# Patient Record
Sex: MALE | Race: WHITE | NOT HISPANIC OR LATINO | Employment: PART TIME | URBAN - METROPOLITAN AREA
[De-identification: names, ages, dates, MRNs, and addresses within clinical notes are randomized per-mention and may not be internally consistent; named-entity substitution may affect disease eponyms.]

---

## 2017-06-24 ENCOUNTER — APPOINTMENT (OUTPATIENT)
Dept: RADIOLOGY | Facility: MEDICAL CENTER | Age: 62
End: 2017-06-24
Attending: EMERGENCY MEDICINE

## 2017-06-24 ENCOUNTER — HOSPITAL ENCOUNTER (EMERGENCY)
Facility: MEDICAL CENTER | Age: 62
End: 2017-06-24
Attending: EMERGENCY MEDICINE

## 2017-06-24 VITALS
BODY MASS INDEX: 28.82 KG/M2 | SYSTOLIC BLOOD PRESSURE: 120 MMHG | HEART RATE: 63 BPM | DIASTOLIC BLOOD PRESSURE: 80 MMHG | TEMPERATURE: 97.2 F | OXYGEN SATURATION: 95 % | HEIGHT: 72 IN | RESPIRATION RATE: 18 BRPM | WEIGHT: 212.74 LBS
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
## 2017-06-24 LAB
COHGB MFR BLD: 3.4 % (ref 0–4.9)
EKG IMPRESSION: NORMAL
GLUCOSE BLD-MCNC: 303 MG/DL (ref 65–99)

## 2017-06-24 PROCEDURE — 82375 ASSAY CARBOXYHB QUANT: CPT

## 2017-06-24 PROCEDURE — 71010 DX-CHEST-LIMITED (1 VIEW): CPT

## 2017-06-24 PROCEDURE — 82962 GLUCOSE BLOOD TEST: CPT

## 2017-06-24 PROCEDURE — 93005 ELECTROCARDIOGRAM TRACING: CPT

## 2017-06-24 PROCEDURE — 99283 EMERGENCY DEPT VISIT LOW MDM: CPT

## 2017-06-24 PROCEDURE — 36415 COLL VENOUS BLD VENIPUNCTURE: CPT

## 2017-06-24 ASSESSMENT — PAIN SCALES - GENERAL: PAINLEVEL_OUTOF10: 2

## 2017-06-24 ASSESSMENT — LIFESTYLE VARIABLES: DO YOU DRINK ALCOHOL: NO
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
## 2017-06-24 NOTE — ED AVS SNAPSHOT
Vidapp Access Code: [email or web address]  Expires: 7/24/2017  4:08 AM    Your email address is not on file at [facility].  Email Addresses are required for you to sign up for Vidapp, please contact [phone] to verify your personal information and to provide your email address prior to attempting to register for Vidapp.    Kuldip Perrin  [address] [place] [address]    [facility] [employer]  A secure, online tool to manage your health information     [employer]’s Vidapp® is a secure, online tool that connects you to your personalized health information from the privacy of your home -- day or night - making it very easy for you to manage your healthcare. Once the activation process is completed, you can even access your medical information using the Vidapp imelda, which is available for free in the Apple Imelda store or Google Play store.     To learn more about Vidapp, visit [email or web address]    There are two levels of access available (as shown below):   My Chart Features  [facility] Primary Care Doctor [facility]  Specialists [facility]  Urgent  Care [facility] Primary Care Doctor   Email your healthcare team securely and privately 24/7 X X X    Manage appointments: schedule your next appointment; view details of past/upcoming appointments X      Request prescription refills. X      View recent personal medical records, including lab and immunizations X X X X   View health record, including health history, allergies, medications X X X X   Read reports about your outpatient visits, procedures, consult and ER notes X X X X   See your discharge summary, which is a recap of your hospital and/or ER visit that includes your diagnosis, lab results, and care plan X X  X     How to register for Vidapp:  Once your e-mail address has been verified, follow the following steps to sign up for Vidapp.     1. Go to  [email or web address]  2. Click on the Sign Up Now box, which takes you to the New Member Sign Up page. You will  need to provide the following information:  a. Enter your Squirrly Access Code exactly as it appears at the top of this page. (You will not need to use this code after you’ve completed the sign-up process. If you do not sign up before the expiration date, you must request a new code.)   b. Enter your date of birth.   c. Enter your home email address.   d. Click Submit, and follow the next screen’s instructions.  3. Create a VigLinkt ID. This will be your Squirrly login ID and cannot be changed, so think of one that is secure and easy to remember.  4. Create a Squirrly password. You can change your password at any time.  5. Enter your Password Reset Question and Answer. This can be used at a later time if you forget your password.   6. Enter your e-mail address. This allows you to receive e-mail notifications when new information is available in Squirrly.  7. Click Sign Up. You can now view your health information.    For assistance activating your Squirrly account, call [phone]

## 2017-06-24 NOTE — ED AVS SNAPSHOT
6/24/2017    Kuldip Perrin  [address] [place] [address]    Dear Kuldip:    [facility] wants to ensure your discharge home is safe and you or your loved ones have had all of your questions answered regarding your care after you leave the hospital.    Below is a list of resources and contact information should you have any questions regarding your hospital stay, follow-up instructions, or active medical symptoms.    Questions or Concerns Regarding… Contact   Medical Questions Related to Your Discharge  (7 days a week, 8am-5pm) Contact a Nurse Care Coordinator   [phone]   Medical Questions Not Related to Your Discharge  (24 hours a day / 7 days a week)  Contact the Nurse Health Line   [phone]    Medications or Discharge Instructions Refer to your discharge packet   or contact your [facility] Primary Care Provider   [phone]   Follow-up Appointment(s) Schedule your appointment via [employer]   or contact Scheduling [phone]   Billing Review your statement via [employer]  or contact Billing [phone]   Medical Records Review your records via [employer]   or contact Medical Records [phone]     You may receive a telephone call within two days of discharge. This call is to make certain you understand your discharge instructions and have the opportunity to have any questions answered. You can also easily access your medical information, test results and upcoming appointments via the [employer] free online health management tool. You can learn more and sign up at [employer]. For assistance setting up your [employer] account, please call [phone].    Once again, we want to ensure your discharge home is safe and that you have a clear understanding of any next steps in your care. If you have any questions or concerns, please do not hesitate to contact us, we are here for you. Thank you for choosing [facility] for your healthcare needs.    Sincerely,    Your [facility] Healthcare Team

## 2017-06-24 NOTE — ED NOTES
Pt given discharge instructions, verbalizes understanding of follow up. Ambulatory out of ED, steady on feet .
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
## 2018-03-06 ENCOUNTER — APPOINTMENT (OUTPATIENT)
Dept: RADIOLOGY | Facility: MEDICAL CENTER | Age: 63
End: 2018-03-06
Attending: EMERGENCY MEDICINE
Payer: COMMERCIAL

## 2018-03-06 ENCOUNTER — HOSPITAL ENCOUNTER (EMERGENCY)
Facility: MEDICAL CENTER | Age: 63
End: 2018-03-06
Attending: EMERGENCY MEDICINE
Payer: COMMERCIAL

## 2018-03-06 VITALS
WEIGHT: 215.83 LBS | BODY MASS INDEX: 29.23 KG/M2 | HEIGHT: 72 IN | TEMPERATURE: 98.6 F | SYSTOLIC BLOOD PRESSURE: 126 MMHG | HEART RATE: 83 BPM | DIASTOLIC BLOOD PRESSURE: 78 MMHG | OXYGEN SATURATION: 99 % | RESPIRATION RATE: 20 BRPM

## 2018-03-06 DIAGNOSIS — J18.9 PNEUMONIA OF RIGHT LUNG DUE TO INFECTIOUS ORGANISM, UNSPECIFIED PART OF LUNG: ICD-10-CM

## 2018-03-06 DIAGNOSIS — R05.9 COUGH: ICD-10-CM

## 2018-03-06 LAB
FLUAV RNA SPEC QL NAA+PROBE: NEGATIVE
FLUBV RNA SPEC QL NAA+PROBE: NEGATIVE
GLUCOSE BLD-MCNC: 129 MG/DL (ref 65–99)

## 2018-03-06 PROCEDURE — 700102 HCHG RX REV CODE 250 W/ 637 OVERRIDE(OP): Performed by: EMERGENCY MEDICINE

## 2018-03-06 PROCEDURE — 82962 GLUCOSE BLOOD TEST: CPT

## 2018-03-06 PROCEDURE — A9270 NON-COVERED ITEM OR SERVICE: HCPCS | Performed by: EMERGENCY MEDICINE

## 2018-03-06 PROCEDURE — 71046 X-RAY EXAM CHEST 2 VIEWS: CPT

## 2018-03-06 PROCEDURE — 700111 HCHG RX REV CODE 636 W/ 250 OVERRIDE (IP): Performed by: EMERGENCY MEDICINE

## 2018-03-06 PROCEDURE — 87502 INFLUENZA DNA AMP PROBE: CPT

## 2018-03-06 PROCEDURE — 99284 EMERGENCY DEPT VISIT MOD MDM: CPT

## 2018-03-06 PROCEDURE — 96372 THER/PROPH/DIAG INJ SC/IM: CPT

## 2018-03-06 RX ORDER — ONDANSETRON 4 MG/1
4 TABLET, ORALLY DISINTEGRATING ORAL ONCE
Status: COMPLETED | OUTPATIENT
Start: 2018-03-06 | End: 2018-03-06

## 2018-03-06 RX ORDER — KETOROLAC TROMETHAMINE 30 MG/ML
30 INJECTION, SOLUTION INTRAMUSCULAR; INTRAVENOUS ONCE
Status: COMPLETED | OUTPATIENT
Start: 2018-03-06 | End: 2018-03-06

## 2018-03-06 RX ORDER — AZITHROMYCIN 250 MG/1
TABLET, FILM COATED ORAL
Qty: 6 TAB | Refills: 0 | Status: SHIPPED | OUTPATIENT
Start: 2018-03-06

## 2018-03-06 RX ORDER — ALBUTEROL SULFATE 90 UG/1
2 AEROSOL, METERED RESPIRATORY (INHALATION) ONCE
Status: COMPLETED | OUTPATIENT
Start: 2018-03-06 | End: 2018-03-06

## 2018-03-06 RX ORDER — AZITHROMYCIN 250 MG/1
500 TABLET, FILM COATED ORAL ONCE
Status: COMPLETED | OUTPATIENT
Start: 2018-03-06 | End: 2018-03-06

## 2018-03-06 RX ADMIN — ONDANSETRON 4 MG: 4 TABLET, ORALLY DISINTEGRATING ORAL at 05:38

## 2018-03-06 RX ADMIN — KETOROLAC TROMETHAMINE 30 MG: 30 INJECTION, SOLUTION INTRAMUSCULAR at 05:40

## 2018-03-06 RX ADMIN — AZITHROMYCIN 500 MG: 250 TABLET, FILM COATED ORAL at 06:21

## 2018-03-06 RX ADMIN — ALBUTEROL SULFATE 2 PUFF: 90 AEROSOL, METERED RESPIRATORY (INHALATION) at 05:45

## 2018-03-06 ASSESSMENT — ENCOUNTER SYMPTOMS
NAUSEA: 0
COUGH: 1
CHILLS: 1
MYALGIAS: 1
VOMITING: 0
ABDOMINAL PAIN: 0
FEVER: 1
DIZZINESS: 0
HEADACHES: 0
SHORTNESS OF BREATH: 0

## 2018-03-06 ASSESSMENT — PAIN SCALES - GENERAL
PAINLEVEL_OUTOF10: 0
PAINLEVEL_OUTOF10: 1
PAINLEVEL_OUTOF10: 0
PAINLEVEL_OUTOF10: 6

## 2018-03-06 ASSESSMENT — PAIN SCALES - WONG BAKER
WONGBAKER_NUMERICALRESPONSE: DOESN'T HURT AT ALL
WONGBAKER_NUMERICALRESPONSE: DOESN'T HURT AT ALL
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
## 2018-03-06 NOTE — ED PROVIDER NOTES
ED Provider Note    Means of arrival: self  History obtained from: patient  History limited by: none    CHIEF COMPLAINT  Chief Complaint   Patient presents with   • Flu Like Symptoms     Pt. states cough, sore throat, cold, body aches, and chills, for the past few days.       HPI  Kuldip Perrin is a 62 y.o. male who presents to the Emergency Department for flulike symptoms. Patient reports that for the past few days he has had myalgias, cough, subjective fevers, chills, and nasal congestion. He states that his cough is productive of phlegm. He describes myalgias as aching, generalized, and mild in severity. He denies chest pain, shortness of breath, nausea, vomiting, and abdominal pain.    REVIEW OF SYSTEMS  Review of Systems   Constitutional: Positive for chills and fever.   Respiratory: Positive for cough. Negative for shortness of breath.    Cardiovascular: Negative for chest pain.   Gastrointestinal: Negative for abdominal pain, nausea and vomiting.   Musculoskeletal: Positive for myalgias.   Neurological: Negative for dizziness and headaches.       PAST MEDICAL HISTORY   has a past medical history of Diabetes (2009); GERD (gastroesophageal reflux disease); Hyperlipidemia; and Hypertension.    SURGICAL HISTORY   has a past surgical history that includes appendectomy.    SOCIAL HISTORY  Social History   Substance Use Topics   • Smoking status: Never Smoker   • Smokeless tobacco: Never Used   • Alcohol use No      History   Drug Use No       FAMILY HISTORY  Family History   Problem Relation Age of Onset   • Diabetes Mother    • Cancer Mother      lung   • Heart Disease Mother      CAD   • Cancer Sister 59     colon   • Diabetes Maternal Grandmother    • Diabetes Maternal Grandfather    • Stroke Maternal Grandfather        CURRENT MEDICATIONS  Home Medications     Reviewed by Cris Corona R.N. (Registered Nurse) on 03/06/18 at 0354  Med List Status: <None>   Medication Last Dose Status    asa/apap/caffeine (EXCEDRIN) 250-250-65 MG Tab 11/29/2016 Active   glipiZIDE (GLUCOTROL) 10 MG Tab  Active   ibuprofen (MOTRIN) 200 MG Tab 11/29/2016 Active   lisinopril (PRINIVIL) 5 MG Tab  Active   metformin (GLUCOPHAGE) 500 MG Tab  Active                ALLERGIES  Allergies   Allergen Reactions   • Cephalexin Shortness of Breath and Swelling     Rxn - 10/2015   • Ciprofloxacin Unspecified     Ankle tendonitis   • Codeine Unspecified     irritable       PHYSICAL EXAM  VITAL SIGNS: /74   Pulse 87   Temp 36 °C (96.8 °F)   Resp 14   Ht 1.829 m (6')   Wt 97.9 kg (215 lb 13.3 oz)   SpO2 97%   BMI 29.27 kg/m²   Vitals reviewed by myself.  Physical Exam   Constitutional: He is oriented to person, place, and time and well-developed, well-nourished, and in no distress. No distress.   HENT:   Head: Normocephalic.   Eyes: EOM are normal.   Neck: Normal range of motion.   Cardiovascular: Normal rate, regular rhythm and normal heart sounds.    Pulmonary/Chest: Effort normal and breath sounds normal. No respiratory distress. He has no wheezes. He has no rales.   Abdominal: Soft. He exhibits no distension. There is no tenderness. There is no rebound.   Musculoskeletal: Normal range of motion.   Neurological: He is alert and oriented to person, place, and time.         DIAGNOSTIC STUDIES /  LABS  Labs Reviewed   ACCU-CHEK GLUCOSE - Abnormal; Notable for the following:        Result Value    Glucose - Accu-Ck 129 (*)     All other components within normal limits   INFLUENZA A/B BY PCR      All labs reviewed by me.      RADIOLOGY  DX-CHEST-2 VIEWS   Final Result         1.  Right infrahilar density suggests underlying infrahilar infiltrate        The radiologist's interpretation of all radiological studies have been reviewed by me.      COURSE & MEDICAL DECISION MAKING  Nursing notes, VS, PMSFHx reviewed in chart.    Patient is a 62-year-old male who comes in for cough and flulike symptoms. Differential diagnosis  includes upper respiratory infection, viral syndrome, influenza, pneumonia. Diagnostic workup includes chest x-ray and influenza swab.    Patient's initial vitals are within normal limits. He is treated with Toradol and albuterol treatment. After treatment patient feels much improved. He states that his breathing has eased dramatically. Influenza swab returns in its negative for the flu. Chest x-ray returns and is positive for right infrahilar density suggestive of likely pneumonia. I advised patient that we will treat him with antibiotics. As he is allergic to fluoroquinolones and cephalosporins I elect to treat him with azithromycin. Patient is given 1st dose of azithromycin here in the emergency department and provided with prescription for azithromycin. He is then given strict return precautions and discharged home in stable condition with vitals are normal limits.       FINAL IMPRESSION  1. Cough    2. Pneumonia of right lung due to infectious organism, unspecified part of lung

## 2018-03-06 NOTE — DISCHARGE INSTRUCTIONS
Community-Acquired Pneumonia, Adult  Introduction  Pneumonia is an infection of the lungs. One type of pneumonia can happen while a person is in a hospital. A different type can happen when a person is not in a hospital (community-acquired pneumonia). It is easy for this kind to spread from person to person. It can spread to you if you breathe near an infected person who coughs or sneezes. Some symptoms include:  · A dry cough.  · A wet (productive) cough.  · Fever.  · Sweating.  · Chest pain.  Follow these instructions at home:  · Take over-the-counter and prescription medicines only as told by your doctor.  ¨ Only take cough medicine if you are losing sleep.  ¨ If you were prescribed an antibiotic medicine, take it as told by your doctor. Do not stop taking the antibiotic even if you start to feel better.  · Sleep with your head and neck raised (elevated). You can do this by putting a few pillows under your head, or you can sleep in a recliner.  · Do not use tobacco products. These include cigarettes, chewing tobacco, and e-cigarettes. If you need help quitting, ask your doctor.  · Drink enough water to keep your pee (urine) clear or pale yellow.  A shot (vaccine) can help prevent pneumonia. Shots are often suggested for:  · People older than 65 years of age.  · People older than 19 years of age:  ¨ Who are having cancer treatment.  ¨ Who have long-term (chronic) lung disease.  ¨ Who have problems with their body's defense system (immune system).  You may also prevent pneumonia if you take these actions:  · Get the flu (influenza) shot every year.  · Go to the dentist as often as told.  · Wash your hands often. If soap and water are not available, use hand .  Contact a doctor if:  · You have a fever.  · You lose sleep because your cough medicine does not help.  Get help right away if:  · You are short of breath and it gets worse.  · You have more chest pain.  · Your sickness gets worse. This is very  serious if:  ¨ You are an older adult.  ¨ Your body's defense system is weak.  · You cough up blood.  This information is not intended to replace advice given to you by your health care provider. Make sure you discuss any questions you have with your health care provider.  Document Released: 06/05/2009 Document Revised: 05/25/2017 Document Reviewed: 04/13/2016  © 2017 Elsevier

## 2018-03-06 NOTE — ED NOTES
PT IS AAOX4, PT IS IN NAD, PT IS BEING D/C, PT WAS GIVEN D/C INSTRUCTIONS AND HE STATED UNDERSTANDING. PT IS ABLE TO AMB  [facility] ASSISTANCE. PT WILL FOLLOW UP WITH PRIMARY CARE Md. PT LEFT WITH FAMILY.

## 2018-03-06 NOTE — ED TRIAGE NOTES
Kuldip Perrin  62 y.o. male  Chief Complaint   Patient presents with   • Flu Like Symptoms     Pt. states cough, sore throat, cold, body aches, and chills, for the past few days.     /74   Pulse 87   Temp 36 °C (96.8 °F)   Resp 14   Ht 1.829 m (6')   Wt 97.9 kg (215 lb 13.3 oz)   SpO2 97%   BMI 29.27 kg/m²     Pt amb to triage with steady gait for above complaint.   Pt is alert and oriented, speaking in full sentences, follows commands and responds appropriately to questions. NAD. Resp are even and unlabored.  Pt placed in lobby. Pt educated on triage process. Pt encouraged to alert staff for any changes.

## 2018-03-22 ENCOUNTER — HOSPITAL ENCOUNTER (EMERGENCY)
Facility: MEDICAL CENTER | Age: 63
End: 2018-03-22
Attending: EMERGENCY MEDICINE
Payer: COMMERCIAL

## 2018-03-22 VITALS
TEMPERATURE: 98.5 F | HEART RATE: 74 BPM | WEIGHT: 210.1 LBS | SYSTOLIC BLOOD PRESSURE: 111 MMHG | OXYGEN SATURATION: 93 % | RESPIRATION RATE: 14 BRPM | DIASTOLIC BLOOD PRESSURE: 64 MMHG | BODY MASS INDEX: 28.46 KG/M2 | HEIGHT: 72 IN

## 2018-03-22 DIAGNOSIS — B35.1 TINEA UNGUIUM: ICD-10-CM

## 2018-03-22 DIAGNOSIS — E11.8 DIABETIC FEET (HCC): ICD-10-CM

## 2018-03-22 PROCEDURE — 99283 EMERGENCY DEPT VISIT LOW MDM: CPT

## 2018-03-22 ASSESSMENT — ENCOUNTER SYMPTOMS
FALLS: 0
BACK PAIN: 0

## 2018-03-22 ASSESSMENT — PAIN SCALES - GENERAL: PAINLEVEL_OUTOF10: 0

## 2018-03-22 NOTE — ED PROVIDER NOTES
"ED Provider Note    Scribed for Rosalino Malik M.D. by Crystal Gallagher. 3/22/2018, 9:36 AM.    Primary care provider: Pcp Pt States None  Means of arrival: walk in   History obtained from: Patient  History limited by: None      CHIEF COMPLAINT  Chief Complaint   Patient presents with   • Toe Pain     pt reports toe pain, suspected fungal infection. Hx DM was told to come in \"for any abnormalitities of the feet.\" NO PCP       HPI  Kuldip Perrin is a 62 y.o. male who presents to the Emergency Department for evaluation of bilateral great toe pain onset several weeks ago. Patient has noted associated white discoloration to the pads of his toes. He was informed that his white discoloration may be due to a fungal infection. Patient has a history of cellulitis to his lower extremities and is concerned for recurrence. Patient confirms history of Diabetes. Patient also complains of chronic lower back pain. He denies any trauma or falls.       REVIEW OF SYSTEMS  Review of Systems   Musculoskeletal: Negative for back pain and falls.        + bilateral great toe pain with discoloration to pads.    E.       PAST MEDICAL HISTORY   has a past medical history of Diabetes (2009); GERD (gastroesophageal reflux disease); Hyperlipidemia; and Hypertension.      SURGICAL HISTORY   has a past surgical history that includes appendectomy.      SOCIAL HISTORY  Social History   Substance Use Topics   • Smoking status: Never Smoker   • Smokeless tobacco: Never Used   • Alcohol use No      History   Drug Use No       FAMILY HISTORY  Family History   Problem Relation Age of Onset   • Diabetes Mother    • Cancer Mother      lung   • Heart Disease Mother      CAD   • Cancer Sister 59     colon   • Diabetes Maternal Grandmother    • Diabetes Maternal Grandfather    • Stroke Maternal Grandfather        CURRENT MEDICATIONS  Home Medications    **Home medications have not yet been reviewed for this encounter**         ALLERGIES  Allergies "   Allergen Reactions   • Cephalexin Shortness of Breath and Swelling     Rxn - 10/2015   • Ciprofloxacin Unspecified     Ankle tendonitis   • Codeine Unspecified     irritable         PHYSICAL EXAM  VITAL SIGNS: /64   Pulse 73   Temp 36.9 °C (98.5 °F)   Resp 16   Ht 1.829 m (6')   Wt 95.3 kg (210 lb 1.6 oz)   SpO2 93%   BMI 28.49 kg/m²   Constitutional: Well developed, Well nourished, No acute distress, Non-toxic appearance.   HENT: Normocephalic, Atraumatic, Bilateral external ears normal.  Eyes:  conjunctiva is normal.   Neck: Supple.   Skin: Warm, Dry. Bilateral great toes with calluses noted. No signs of a wound, erythema or other abnormality. Toes with tinea unguium but no signs of erythema or infection.   Musculoskeletal: Good range of motion in all major joints. No tenderness to palpation or major deformities noted. Intact distal pulses, no clubbing, no cyanosis, no edema,   Neurologic: Alert & oriented x 3, Normal motor function, Normal sensory function, No focal deficits noted.   Psychiatric: Affect normal, Judgment normal, Mood normal.         COURSE & MEDICAL DECISION MAKING  Nursing notes, VS, PMSFHx reviewed in chart.    9:36 AM - Patient seen and examined at bedside. Patient informed he does have a fungal infection to his toes in addition to calluses. He was advised to follow up with Podiatry and wear different shoes. Additionally, encouraged that he observe for any signs of bacterial infection given his history of Diabetes and Cellulitis. Patient will follow up with the [facility] clinic.           DISPOSITION:  Patient will be discharged home in stable condition.      FOLLOW UP:  [address] [address]  [phone]  Schedule an appointment as soon as possible for a visit          OUTPATIENT MEDICATIONS:  Discharge Medication List as of 3/22/2018 10:03 AM          FINAL IMPRESSION  1. Diabetic feet (CMS-[facility])    2. Tinea unguium           Crystal AHUMADA.  Aileen (Joieibkathy), am scribing for, and in the presence of, Rosalino Malik M.D..  Electronically signed by: Crystal Gallagher (Kim), 3/22/2018  IRosalino M.D. personally performed the services described in this documentation, as scribed by Crystal Gallagher in my presence, and it is both accurate and complete.    The note accurately reflects work and decisions made by me.  Rosalino Malik  3/22/2018  12:51 PM

## 2018-03-22 NOTE — DISCHARGE INSTRUCTIONS
Diabetes and Foot Care  Diabetes may cause you to have problems because of poor blood supply (circulation) to your feet and legs. This may cause the skin on your feet to become thinner, break easier, and heal more slowly. Your skin may become dry, and the skin may peel and crack. You may also have nerve damage in your legs and feet causing decreased feeling in them. You may not notice minor injuries to your feet that could lead to infections or more serious problems. Taking care of your feet is one of the most important things you can do for yourself.  Follow these instructions at home:  · Wear shoes at all times, even in the house. Do not go barefoot. Bare feet are easily injured.  · Check your feet daily for blisters, cuts, and redness. If you cannot see the bottom of your feet, use a mirror or ask someone for help.  · Wash your feet with warm water (do not use hot water) and mild soap. Then pat your feet and the areas between your toes until they are completely dry. Do not soak your feet as this can dry your skin.  · Apply a moisturizing lotion or petroleum jelly (that does not contain alcohol and is unscented) to the skin on your feet and to dry, brittle toenails. Do not apply lotion between your toes.  · Trim your toenails straight across. Do not dig under them or around the cuticle. File the edges of your nails with an emery board or nail file.  · Do not cut corns or calluses or try to remove them with medicine.  · Wear clean socks or stockings every day. Make sure they are not too tight. Do not wear knee-high stockings since they may decrease blood flow to your legs.  · Wear shoes that fit properly and have enough cushioning. To break in new shoes, wear them for just a few hours a day. This prevents you from injuring your feet. Always look in your shoes before you put them on to be sure there are no objects inside.  · Do not cross your legs. This may decrease the blood flow to your feet.  · If you find a  minor scrape, cut, or break in the skin on your feet, keep it and the skin around it clean and dry. These areas may be cleansed with mild soap and water. Do not cleanse the area with peroxide, alcohol, or iodine.  · When you remove an adhesive bandage, be sure not to damage the skin around it.  · If you have a wound, look at it several times a day to make sure it is healing.  · Do not use heating pads or hot water bottles. They may burn your skin. If you have lost feeling in your feet or legs, you may not know it is happening until it is too late.  · Make sure your health care provider performs a complete foot exam at least annually or more often if you have foot problems. Report any cuts, sores, or bruises to your health care provider immediately.  Contact a health care provider if:  · You have an injury that is not healing.  · You have cuts or breaks in the skin.  · You have an ingrown nail.  · You notice redness on your legs or feet.  · You feel burning or tingling in your legs or feet.  · You have pain or cramps in your legs and feet.  · Your legs or feet are numb.  · Your feet always feel cold.  Get help right away if:  · There is increasing redness, swelling, or pain in or around a wound.  · There is a red line that goes up your leg.  · Pus is coming from a wound.  · You develop a fever or as directed by your health care provider.  · You notice a bad smell coming from an ulcer or wound.  This information is not intended to replace advice given to you by your health care provider. Make sure you discuss any questions you have with your health care provider.  Document Released: 12/15/2001 Document Revised: 05/25/2017 Document Reviewed: 05/27/2014  [employer] Patient Education © 2017 [employer]      Fungal Nail Infection  Introduction  Fungal nail infection is a common fungal infection of the toenails or fingernails. This condition affects toenails more often than fingernails. More than one nail may  be infected. The condition can be passed from person to person (is contagious).  What are the causes?  This condition is caused by a fungus. Several types of funguses can cause the infection. These funguses are common in moist and warm areas. If your hands or feet come into contact with the fungus, it may get into a crack in your fingernail or toenail and cause the infection.  What increases the risk?  The following factors may make you more likely to develop this condition:  · Being male.  · Having diabetes.  · Being of older age.  · Living with someone who has the fungus.  · Walking barefoot in areas where the fungus thrives, such as showers or locker rooms.  · Having poor circulation.  · Wearing shoes and socks that cause your feet to sweat.  · Having athlete’s foot.  · Having a nail injury or history of a recent nail surgery.  · Having psoriasis.  · Having a weak body defense system (immune system).  What are the signs or symptoms?  Symptoms of this condition include:  · A pale spot on the nail.  · Thickening of the nail.  · A nail that becomes yellow or brown.  · A brittle or ragged nail edge.  · A crumbling nail.  · A nail that has lifted away from the nail bed.  How is this diagnosed?  This condition is diagnosed with a physical exam. Your health care provider may take a scraping or clipping from your nail to test for the fungus.  How is this treated?  Mild infections do not need treatment. If you have significant nail changes, treatment may include:  · Oral antifungal medicines. You may need to take the medicine for several weeks or several months, and you may not see the results for a long time. These medicines can cause side effects. Ask your health care provider what problems to watch for.  · Antifungal nail polish and nail cream. These may be used along with oral antifungal medicines.  · Laser treatment of the nail.  · Surgery to remove the nail. This may be needed for the most severe  infections.  Treatment takes a long time, and the infection may come back.  Follow these instructions at home:  Medicines  · Take or apply over-the-counter and prescription medicines only as told by your health care provider.  · Ask your health care provider about using over-the-counter mentholated ointment on your nails.  Lifestyle  · Do not share personal items, such as towels or nail clippers.  · Trim your nails often.  · Wash and dry your hands and feet every day.  · Wear absorbent socks, and change your socks frequently.  · Wear shoes that allow air to circulate, such as sandals or canvas tennis shoes. Throw out old shoes.  · Wear rubber gloves if you are working with your hands in wet areas.  · Do not walk barefoot in shower rooms or locker rooms.  · Do not use a nail salon that does not use clean instruments.  · Do not use artificial nails.  General instructions  · Keep all follow-up visits as told by your health care provider. This is important.  · Use antifungal foot powder on your feet and in your shoes.  Contact a health care provider if:  Your infection is not getting better or it is getting worse after several months.  This information is not intended to replace advice given to you by your health care provider. Make sure you discuss any questions you have with your health care provider.  Document Released: 12/15/2001 Document Revised: 05/25/2017 Document Reviewed: 06/20/2016  © 2017 [employer]

## 2018-03-22 NOTE — ED TRIAGE NOTES
"Chief Complaint   Patient presents with   • Toe Pain     pt reports toe pain, suspected fungal infection. Hx DM was told to come in \"for any abnormalitities of the feet.\" NO PCP     Blood pressure 146/64, pulse 73, temperature 36.9 °C (98.5 °F), resp. rate 16, height 1.829 m (6'), weight 95.3 kg (210 lb 1.6 oz), SpO2 93 %.    Pt informed of wait times. Educated on triage process.  Asked to return to triage RN for any new or worsening of symptoms. Thanked for patience.        "

## 2018-06-26 ENCOUNTER — HOSPITAL ENCOUNTER (EMERGENCY)
Facility: MEDICAL CENTER | Age: 63
End: 2018-06-26
Attending: EMERGENCY MEDICINE
Payer: COMMERCIAL

## 2018-06-26 ENCOUNTER — APPOINTMENT (OUTPATIENT)
Dept: RADIOLOGY | Facility: MEDICAL CENTER | Age: 63
End: 2018-06-26
Payer: COMMERCIAL

## 2018-06-26 VITALS
TEMPERATURE: 98.9 F | WEIGHT: 211.86 LBS | OXYGEN SATURATION: 98 % | SYSTOLIC BLOOD PRESSURE: 121 MMHG | DIASTOLIC BLOOD PRESSURE: 74 MMHG | HEIGHT: 72 IN | BODY MASS INDEX: 28.7 KG/M2 | HEART RATE: 77 BPM | RESPIRATION RATE: 16 BRPM

## 2018-06-26 DIAGNOSIS — M75.112 INCOMPLETE TEAR OF LEFT ROTATOR CUFF: ICD-10-CM

## 2018-06-26 LAB
ALBUMIN SERPL BCP-MCNC: 4.5 G/DL (ref 3.2–4.9)
ALBUMIN/GLOB SERPL: 1.4 G/DL
ALP SERPL-CCNC: 69 U/L (ref 30–99)
ALT SERPL-CCNC: 37 U/L (ref 2–50)
ANION GAP SERPL CALC-SCNC: 9 MMOL/L (ref 0–11.9)
APTT PPP: 36.8 SEC (ref 24.7–36)
AST SERPL-CCNC: 19 U/L (ref 12–45)
BASOPHILS # BLD AUTO: 0.2 % (ref 0–1.8)
BASOPHILS # BLD: 0.01 K/UL (ref 0–0.12)
BILIRUB SERPL-MCNC: 0.6 MG/DL (ref 0.1–1.5)
BNP SERPL-MCNC: 21 PG/ML (ref 0–100)
BUN SERPL-MCNC: 22 MG/DL (ref 8–22)
CALCIUM SERPL-MCNC: 9.3 MG/DL (ref 8.5–10.5)
CHLORIDE SERPL-SCNC: 105 MMOL/L (ref 96–112)
CO2 SERPL-SCNC: 21 MMOL/L (ref 20–33)
CREAT SERPL-MCNC: 0.78 MG/DL (ref 0.5–1.4)
EKG IMPRESSION: NORMAL
EOSINOPHIL # BLD AUTO: 0.07 K/UL (ref 0–0.51)
EOSINOPHIL NFR BLD: 1.1 % (ref 0–6.9)
ERYTHROCYTE [DISTWIDTH] IN BLOOD BY AUTOMATED COUNT: 38.7 FL (ref 35.9–50)
GLOBULIN SER CALC-MCNC: 3.2 G/DL (ref 1.9–3.5)
GLUCOSE SERPL-MCNC: 157 MG/DL (ref 65–99)
HCT VFR BLD AUTO: 40.8 % (ref 42–52)
HGB BLD-MCNC: 14.7 G/DL (ref 14–18)
IMM GRANULOCYTES # BLD AUTO: 0.02 K/UL (ref 0–0.11)
IMM GRANULOCYTES NFR BLD AUTO: 0.3 % (ref 0–0.9)
INR PPP: 0.99 (ref 0.87–1.13)
LIPASE SERPL-CCNC: 9 U/L (ref 11–82)
LYMPHOCYTES # BLD AUTO: 2.03 K/UL (ref 1–4.8)
LYMPHOCYTES NFR BLD: 31 % (ref 22–41)
MCH RBC QN AUTO: 30.5 PG (ref 27–33)
MCHC RBC AUTO-ENTMCNC: 36 G/DL (ref 33.7–35.3)
MCV RBC AUTO: 84.6 FL (ref 81.4–97.8)
MONOCYTES # BLD AUTO: 0.69 K/UL (ref 0–0.85)
MONOCYTES NFR BLD AUTO: 10.5 % (ref 0–13.4)
NEUTROPHILS # BLD AUTO: 3.73 K/UL (ref 1.82–7.42)
NEUTROPHILS NFR BLD: 56.9 % (ref 44–72)
NRBC # BLD AUTO: 0 K/UL
NRBC BLD-RTO: 0 /100 WBC
PLATELET # BLD AUTO: 243 K/UL (ref 164–446)
PMV BLD AUTO: 9.8 FL (ref 9–12.9)
POTASSIUM SERPL-SCNC: 4 MMOL/L (ref 3.6–5.5)
PROT SERPL-MCNC: 7.7 G/DL (ref 6–8.2)
PROTHROMBIN TIME: 12.8 SEC (ref 12–14.6)
RBC # BLD AUTO: 4.82 M/UL (ref 4.7–6.1)
SODIUM SERPL-SCNC: 135 MMOL/L (ref 135–145)
TROPONIN I SERPL-MCNC: <0.01 NG/ML (ref 0–0.04)
WBC # BLD AUTO: 6.6 K/UL (ref 4.8–10.8)

## 2018-06-26 PROCEDURE — 85610 PROTHROMBIN TIME: CPT

## 2018-06-26 PROCEDURE — 85025 COMPLETE CBC W/AUTO DIFF WBC: CPT

## 2018-06-26 PROCEDURE — 700111 HCHG RX REV CODE 636 W/ 250 OVERRIDE (IP): Performed by: EMERGENCY MEDICINE

## 2018-06-26 PROCEDURE — 96372 THER/PROPH/DIAG INJ SC/IM: CPT

## 2018-06-26 PROCEDURE — 84484 ASSAY OF TROPONIN QUANT: CPT

## 2018-06-26 PROCEDURE — 93005 ELECTROCARDIOGRAM TRACING: CPT | Performed by: EMERGENCY MEDICINE

## 2018-06-26 PROCEDURE — 85730 THROMBOPLASTIN TIME PARTIAL: CPT

## 2018-06-26 PROCEDURE — 80053 COMPREHEN METABOLIC PANEL: CPT

## 2018-06-26 PROCEDURE — 83690 ASSAY OF LIPASE: CPT

## 2018-06-26 PROCEDURE — 99284 EMERGENCY DEPT VISIT MOD MDM: CPT

## 2018-06-26 PROCEDURE — 83880 ASSAY OF NATRIURETIC PEPTIDE: CPT

## 2018-06-26 PROCEDURE — 93005 ELECTROCARDIOGRAM TRACING: CPT

## 2018-06-26 RX ORDER — KETOROLAC TROMETHAMINE 10 MG/1
10 TABLET, FILM COATED ORAL EVERY 4 HOURS PRN
Qty: 30 TAB | Refills: 0 | Status: SHIPPED | OUTPATIENT
Start: 2018-06-26

## 2018-06-26 RX ORDER — KETOROLAC TROMETHAMINE 30 MG/ML
15 INJECTION, SOLUTION INTRAMUSCULAR; INTRAVENOUS ONCE
Status: COMPLETED | OUTPATIENT
Start: 2018-06-26 | End: 2018-06-26

## 2018-06-26 RX ADMIN — KETOROLAC TROMETHAMINE 15 MG: 30 INJECTION, SOLUTION INTRAMUSCULAR at 23:01

## 2018-06-26 ASSESSMENT — ENCOUNTER SYMPTOMS
NECK PAIN: 1
SHORTNESS OF BREATH: 0

## 2018-06-26 ASSESSMENT — LIFESTYLE VARIABLES: DO YOU DRINK ALCOHOL: NO

## 2018-06-26 ASSESSMENT — PAIN SCALES - GENERAL: PAINLEVEL_OUTOF10: 7

## 2018-06-27 NOTE — ED PROVIDER NOTES
ED Provider Note    Scribed for Chauncey Hadley M.D. by Odette Mayers. 6/26/2018  10:15 PM    Means of arrival: walk in   History obtained by: patient   Limitations: none       CHIEF COMPLAINT  Chief Complaint   Patient presents with   • Neck Pain     started lastnight started at work with minor labor    • Shoulder Pain     Left side, started lastnight at same time   • Arm Pain     Left side, started lastnight       HPI  Kuldip Perrin is a 63 y.o. male who presents for evaluation of shoulder pain onset last night. Patient was working last night lifting some boxes when he began to notice the pain. He reports associated left shoulder pain and left arm pain. He has taken Ibuprofen for pain relief. No complaints of shortness of breath. Patient denies any associated nausea or diaphoresis. He denies any associated chest pain.    REVIEW OF SYSTEMS  Review of Systems   Respiratory: Negative for shortness of breath.    Musculoskeletal: Positive for joint pain (left shoulder) and neck pain.        Left arm pain    See HPI for further details. E    PAST MEDICAL HISTORY   has a past medical history of Diabetes (2009); GERD (gastroesophageal reflux disease); Hyperlipidemia; and Hypertension.    SOCIAL HISTORY  Social History     Social History Main Topics   • Smoking status: Never Smoker   • Smokeless tobacco: Never Used   • Alcohol use No   • Drug use: No   • Sexual activity: None noted       Comment: ,promotional advertising       SURGICAL HISTORY   has a past surgical history that includes appendectomy.    CURRENT MEDICATIONS  Current medications can be reviewed in the nurse's note.     ALLERGIES  Allergies   Allergen Reactions   • Cephalexin Shortness of Breath and Swelling     Rxn - 10/2015   • Ciprofloxacin Unspecified     Ankle tendonitis   • Codeine Unspecified     irritable       PHYSICAL EXAM  /74   Pulse 77   Temp 37.2 °C (98.9 °F)   Resp 16   Ht 1.829 m (6')   Wt 96.1 kg (211 lb 13.8 oz)    SpO2 98%   BMI 28.73 kg/m²   Constitutional: Well developed, Well nourished, No acute distress, Non-toxic appearance.   HENT: Normocephalic, Atraumatic,  Eyes: PERRL, EOM intact  Neck: Supple, no meningismus  Lymphatic: No lymphadenopathy noted.   Cardiovascular: Regular rate and rhythm  Lungs: Clear to auscultation bilaterally, easy unlabored respirations   Abdomen: Bowel sounds normal, Soft, No tenderness  Skin: Warm, Dry, no rash  Back: No tenderness, No CVA tenderness.   Extremities: Mild pain overlying the left rotator cuff, full range of motion to the left upper extremity, mild pain with motion to the left upper extremity, Hawking's test positive  Neurologic: Alert and oriented, appropriate, follows commands, moving all extremities, normal speech   Psychiatric: Affect normal      DIAGNOSTIC STUDIES / PROCEDURES    EKG    Interpreted by me as shown below    LABS  Results for orders placed or performed during the hospital encounter of 06/26/18   Troponin   Result Value Ref Range    Troponin I <0.01 0.00 - 0.04 ng/mL   Btype Natriuretic Peptide   Result Value Ref Range    B Natriuretic Peptide 21 0 - 100 pg/mL   CBC with Differential   Result Value Ref Range    WBC 6.6 4.8 - 10.8 K/uL    RBC 4.82 4.70 - 6.10 M/uL    Hemoglobin 14.7 14.0 - 18.0 g/dL    Hematocrit 40.8 (L) 42.0 - 52.0 %    MCV 84.6 81.4 - 97.8 fL    MCH 30.5 27.0 - 33.0 pg    MCHC 36.0 (H) 33.7 - 35.3 g/dL    RDW 38.7 35.9 - 50.0 fL    Platelet Count 243 164 - 446 K/uL    MPV 9.8 9.0 - 12.9 fL    Neutrophils-Polys 56.90 44.00 - 72.00 %    Lymphocytes 31.00 22.00 - 41.00 %    Monocytes 10.50 0.00 - 13.40 %    Eosinophils 1.10 0.00 - 6.90 %    Basophils 0.20 0.00 - 1.80 %    Immature Granulocytes 0.30 0.00 - 0.90 %    Nucleated RBC 0.00 /100 WBC    Neutrophils (Absolute) 3.73 1.82 - 7.42 K/uL    Lymphs (Absolute) 2.03 1.00 - 4.80 K/uL    Monos (Absolute) 0.69 0.00 - 0.85 K/uL    Eos (Absolute) 0.07 0.00 - 0.51 K/uL    Baso (Absolute) 0.01 0.00 -  0.12 K/uL    Immature Granulocytes (abs) 0.02 0.00 - 0.11 K/uL    NRBC (Absolute) 0.00 K/uL   Complete Metabolic Panel (CMP)   Result Value Ref Range    Sodium 135 135 - 145 mmol/L    Potassium 4.0 3.6 - 5.5 mmol/L    Chloride 105 96 - 112 mmol/L    Co2 21 20 - 33 mmol/L    Anion Gap 9.0 0.0 - 11.9    Glucose 157 (H) 65 - 99 mg/dL    Bun 22 8 - 22 mg/dL    Creatinine 0.78 0.50 - 1.40 mg/dL    Calcium 9.3 8.5 - 10.5 mg/dL    AST(SGOT) 19 12 - 45 U/L    ALT(SGPT) 37 2 - 50 U/L    Alkaline Phosphatase 69 30 - 99 U/L    Total Bilirubin 0.6 0.1 - 1.5 mg/dL    Albumin 4.5 3.2 - 4.9 g/dL    Total Protein 7.7 6.0 - 8.2 g/dL    Globulin 3.2 1.9 - 3.5 g/dL    A-G Ratio 1.4 g/dL   Prothrombin Time   Result Value Ref Range    PT 12.8 12.0 - 14.6 sec    INR 0.99 0.87 - 1.13   APTT   Result Value Ref Range    APTT 36.8 (H) 24.7 - 36.0 sec   Lipase   Result Value Ref Range    Lipase 9 (L) 11 - 82 U/L   ESTIMATED GFR   Result Value Ref Range    GFR If African American >60 >60 mL/min/1.73 m 2    GFR If Non African American >60 >60 mL/min/1.73 m 2   EKG (NOW)   Result Value Ref Range    Report       [facility] Emergency Dept.    Test Date:  2018  Pt Name:    PAZ BRAY                Department: ER  MRN:        [id number]                      Room:  Gender:     Male                         Technician: [id number]  :        [id number]                   Requested By:ER TRIAGE PROTOCOL  Order #:    [id number]                    Gracy MD: Leonie Grossman MD    Measurements  Intervals                                Axis  Rate:       71                           P:          28  FL:         136                          QRS:        67  QRSD:       90                           T:          36  QT:         388  QTc:        422    Interpretive Statements  EKG is normal sinus rhythm normal axis normal intervals no ST changes  consistent  with acute regional ischemia    Electronically Signed On 2018 22:13:20 PDT by  Leonie Grossman MD           RADIOLOGY  DX-CHEST-LIMITED (1 VIEW)    (Results Pending)         COURSE & MEDICAL DECISION MAKING  Pertinent Labs & Imaging studies reviewed. (See chart for details)    Ordered estimated GFR, troponin, BNP, CBC, CMP, PTT, APTT, lipase, EKG, per protocol.     10:15 PM Patient seen and examined at bedside. The patient presents with neck pain and left shoulder pain. Patient will be treated with Toradol 15 mg for his symptoms. Informed the patient I believe his symptoms are secondary to a possible tear to his left rotator cuff. Encouraged him to take Ibuprofen for pain control. He was discharged home with a prescription for Toradol. Discussed strict return precautions and follow up instructions with the patient. He understands and agrees to be discharged home.     Patient here with symptoms consistent with rotator cuff tear. I believe that a cardiopulmonary etiology is highly unlikely. Labs and EKG were checked via triage protocol though I believe that a systemic issue or neurologic issues highly unlikely in this very well-appearing patient. Patient's labs were unremarkable. His EKG is unremarkable. Patient will follow up with primary care physician for ongoing care. I will treat him supportively in the interim.    The patient will return for new or worsening symptoms and is stable at the time of discharge.      DISPOSITION:  Patient will be discharged home in stable condition.    FOLLOW UP:  [address] [address] [phone]  Schedule an appointment as soon as possible for a visit      OUTPATIENT MEDICATIONS:  New Prescriptions    KETOROLAC (TORADOL) 10 MG TAB    Take 1 Tab by mouth every four hours as needed.     FINAL IMPRESSION  1. Incomplete tear of left rotator cuff          Odette AHUMADA (Kim), am scribing for, and in the presence of, Chauncey Hadley M.D..    Electronically signed by: Odette Mayers (Kim), 6/26/2018    BRANDYN  Chauncey Hadley M.D. personally performed the services described in this documentation, as scribed by Odette Mayers in my presence, and it is both accurate and complete.    The note accurately reflects work and decisions made by me.  Chauncey Hadley  6/27/2018  2:01 AM

## 2018-06-27 NOTE — ED NOTES
Pt arrived in room and is changed into gown.  Pt resting comfortably on gurney.   Call light within reach and visible from nurses station.   Family at bedside

## 2018-06-27 NOTE — DISCHARGE INSTRUCTIONS
Rotator Cuff Injury  Rotator cuff injury is any type of injury to the set of muscles and tendons that make up the stabilizing unit of your shoulder. This unit holds the ball of your upper arm bone (humerus) in the socket of your shoulder blade (scapula).  What are the causes?  Injuries to your rotator cuff most commonly come from sports or activities that cause your arm to be moved repeatedly over your head. Examples of this include throwing, weight lifting, swimming, or racquet sports. Long lasting (chronic) irritation of your rotator cuff can cause soreness and swelling (inflammation), bursitis, and eventual damage to your tendons, such as a tear (rupture).  What are the signs or symptoms?  Acute rotator cuff tear:  · Sudden tearing sensation followed by severe pain shooting from your upper shoulder down your arm toward your elbow.  · Decreased range of motion of your shoulder because of pain and muscle spasm.  · Severe pain.  · Inability to raise your arm out to the side because of pain and loss of muscle power (large tears).  Chronic rotator cuff tear:  · Pain that usually is worse at night and may interfere with sleep.  · Gradual weakness and decreased shoulder motion as the pain worsens.  · Decreased range of motion.  Rotator cuff tendinitis:  · Deep ache in your shoulder and the outside upper arm over your shoulder.  · Pain that comes on gradually and becomes worse when lifting your arm to the side or turning it inward.  How is this diagnosed?  Rotator cuff injury is diagnosed through a medical history, physical exam, and imaging exam. The medical history helps determine the type of rotator cuff injury. Your health care provider will look at your injured shoulder, feel the injured area, and ask you to move your shoulder in different positions. X-ray exams typically are done to rule out other causes of shoulder pain, such as fractures. MRI is the exam of choice for the most severe shoulder injuries because the  images show muscles and tendons.  How is this treated?  Chronic tear:  · Medicine for pain, such as acetaminophen or ibuprofen.  · Physical therapy and range-of-motion exercises may be helpful in maintaining shoulder function and strength.  · Steroid injections into your shoulder joint.  · Surgical repair of the rotator cuff if the injury does not heal with noninvasive treatment.  Acute tear:  · Anti-inflammatory medicines such as ibuprofen and naproxen to help reduce pain and swelling.  · A sling to help support your arm and rest your rotator cuff muscles. Long-term use of a sling is not advised. It may cause significant stiffening of the shoulder joint.  · Surgery may be considered within a few weeks, especially in younger, active people, to return the shoulder to full function.  · Indications for surgical treatment include the following:  ¨ Age younger than 60 years.  ¨ Rotator cuff tears that are complete.  ¨ Physical therapy, rest, and anti-inflammatory medicines have been used for 6-8 weeks, with no improvement.  ¨ Employment or sporting activity that requires constant shoulder use.  Tendinitis:  · Anti-inflammatory medicines such as ibuprofen and naproxen to help reduce pain and swelling.  · A sling to help support your arm and rest your rotator cuff muscles. Long-term use of a sling is not advised. It may cause significant stiffening of the shoulder joint.  · Severe tendinitis may require:  ¨ Steroid injections into your shoulder joint.  ¨ Physical therapy.  ¨ Surgery.  Follow these instructions at home:  · Apply ice to your injury:  ¨ Put ice in a plastic bag.  ¨ Place a towel between your skin and the bag.  ¨ Leave the ice on for 20 minutes, 2-3 times a day.  · If you have a shoulder immobilizer (sling and straps), wear it until told otherwise by your health care provider.  · You may want to sleep on several pillows or in a recliner at night to lessen swelling and pain.  · Only take over-the-counter or  prescription medicines for pain, discomfort, or fever as directed by your health care provider.  · Do simple hand squeezing exercises with a soft rubber ball to decrease hand swelling.  Contact a health care provider if:  · Your shoulder pain increases, or new pain or numbness develops in your arm, hand, or fingers.  · Your hand or fingers are colder than your other hand.  Get help right away if:  · Your arm, hand, or fingers are numb or tingling.  · Your arm, hand, or fingers are increasingly swollen and painful, or they turn white or blue.  This information is not intended to replace advice given to you by your health care provider. Make sure you discuss any questions you have with your health care provider.  Document Released: 12/15/2001 Document Revised: 05/25/2017 Document Reviewed: 07/30/2014  [email or web address] Interactive Patient Education © 2017 [employer]

## 2018-06-27 NOTE — ED TRIAGE NOTES
Kuldip Guerra Aydin  63 y.o.  /76   Pulse 70   Temp 37.3 °C (99.1 °F)   Resp 18   Ht 1.829 m (6')   Wt 96.1 kg (211 lb 13.8 oz)   SpO2 96%   BMI 28.73 kg/m²   Chief Complaint   Patient presents with   • Neck Pain     started lastnight started at work with minor labor    • Shoulder Pain     Left side, started lastnight at same time   • Arm Pain     Left side, started lastnight     Pt ambulates to triage with steady gait, pt states last night it started at 1800 and progressively got worse up to midninght, pt states Diaphoresis, SOB and dizziness. Pt left work to go home, no pain or discomfort relief after 1200mg Ibuprofen. EKG called, order placed, CP protocol started. Pt is A&Ox4, VSS, denies paresthesia, complains of 6/10 L arm and shoulder pains. Pt safe to be returned to lobby, educated on triage process and wait times, instructed to notify any staff of worsening/changing of symptoms.

## 2018-07-01 ENCOUNTER — APPOINTMENT (OUTPATIENT)
Dept: RADIOLOGY | Facility: MEDICAL CENTER | Age: 63
End: 2018-07-01
Attending: EMERGENCY MEDICINE
Payer: COMMERCIAL

## 2018-07-01 ENCOUNTER — HOSPITAL ENCOUNTER (EMERGENCY)
Facility: MEDICAL CENTER | Age: 63
End: 2018-07-01
Attending: EMERGENCY MEDICINE
Payer: COMMERCIAL

## 2018-07-01 VITALS
BODY MASS INDEX: 29.2 KG/M2 | HEART RATE: 60 BPM | RESPIRATION RATE: 16 BRPM | TEMPERATURE: 97.5 F | OXYGEN SATURATION: 96 % | WEIGHT: 215.61 LBS | HEIGHT: 72 IN | SYSTOLIC BLOOD PRESSURE: 128 MMHG | DIASTOLIC BLOOD PRESSURE: 68 MMHG

## 2018-07-01 DIAGNOSIS — M50.30 DEGENERATIVE DISC DISEASE, CERVICAL: ICD-10-CM

## 2018-07-01 DIAGNOSIS — M62.838 MUSCLE SPASM: ICD-10-CM

## 2018-07-01 DIAGNOSIS — M25.512 ACUTE PAIN OF LEFT SHOULDER: ICD-10-CM

## 2018-07-01 PROCEDURE — 700111 HCHG RX REV CODE 636 W/ 250 OVERRIDE (IP): Performed by: EMERGENCY MEDICINE

## 2018-07-01 PROCEDURE — 96372 THER/PROPH/DIAG INJ SC/IM: CPT

## 2018-07-01 PROCEDURE — 72125 CT NECK SPINE W/O DYE: CPT

## 2018-07-01 PROCEDURE — 99284 EMERGENCY DEPT VISIT MOD MDM: CPT

## 2018-07-01 PROCEDURE — 73030 X-RAY EXAM OF SHOULDER: CPT | Mod: LT

## 2018-07-01 RX ORDER — NAPROXEN 500 MG/1
500 TABLET ORAL
Qty: 20 TAB | Refills: 0 | Status: SHIPPED | OUTPATIENT
Start: 2018-07-01

## 2018-07-01 RX ORDER — KETOROLAC TROMETHAMINE 30 MG/ML
30 INJECTION, SOLUTION INTRAMUSCULAR; INTRAVENOUS ONCE
Status: COMPLETED | OUTPATIENT
Start: 2018-07-01 | End: 2018-07-01

## 2018-07-01 RX ORDER — CYCLOBENZAPRINE HCL 5 MG
5-10 TABLET ORAL 3 TIMES DAILY PRN
Qty: 30 TAB | Refills: 0 | Status: SHIPPED | OUTPATIENT
Start: 2018-07-01

## 2018-07-01 RX ADMIN — KETOROLAC TROMETHAMINE 30 MG: 30 INJECTION, SOLUTION INTRAMUSCULAR; INTRAVENOUS at 15:06

## 2018-07-01 ASSESSMENT — PAIN SCALES - GENERAL: PAINLEVEL_OUTOF10: 4

## 2018-07-01 NOTE — ED TRIAGE NOTES
Pt ambulatory to triage. Pt was seen here 4 days ago for the same. Pt c/o left shoulder pain and soreness in his arm, neck, and back. Pt was diagnosed with tear of rotator cuff. Pt states that pain is getting progressively worse since he was seen. Pt is able to move arm, CMS intact.    Chief Complaint   Patient presents with   • Shoulder Injury     Pt placed in lobby, updated on triage process. Pt educated to notified RN or triage tech if changes in condition.

## 2018-07-01 NOTE — ED PROVIDER NOTES
ED Provider Note    ER PROVIDER NOTE        CHIEF COMPLAINT  Chief Complaint   Patient presents with   • Shoulder Injury       HPI  Kuldip Perrin is a 63 y.o. male who presents to the emergency department complaining of left shoulder and neck pain.  Patient reports that 5-6 days ago while at work he was lifting some duct material, began to have pain in that area although did not have any actual trauma.  Was seen in the emergency department here a few days ago with negative cardiac workup told it was his rotator cuff although states his symptoms have not improved with the medication provided.  States the pain is worse with movement, seems to shoot out from his neck and down his arm.  He denies any focal weakness numbness or tingling, no fevers or chills, no redness swelling    REVIEW OF SYSTEMS  Pertinent positives include neck/shoulder pain. Pertinent negatives include no fevers. See HPI for details. All other systems reviewed and are negative.    PAST MEDICAL HISTORY   has a past medical history of Diabetes (2009); GERD (gastroesophageal reflux disease); Hyperlipidemia; and Hypertension.    SOCIAL HISTORY  Social History   Substance Use Topics   • Smoking status: Never Smoker   • Smokeless tobacco: Never Used   • Alcohol use No       SURGICAL HISTORY   has a past surgical history that includes appendectomy.    CURRENT MEDICATIONS  Home Medications    **Home medications have not yet been reviewed for this encounter**         ALLERGIES  Allergies   Allergen Reactions   • Cephalexin Shortness of Breath and Swelling     Rxn - 10/2015   • Ciprofloxacin Unspecified     Ankle tendonitis   • Codeine Unspecified     irritable       PHYSICAL EXAM  VITAL SIGNS: /75   Pulse 70   Temp 36.4 °C (97.5 °F)   Resp 17   Ht 1.829 m (6')   Wt 97.8 kg (215 lb 9.8 oz)   SpO2 98%   BMI 29.24 kg/m²   Pulse ox interpretation: I interpret this pulse ox as normal.    Constitutional: Alert.  In no apparent distress.  HENT:  Normocephalic, Atraumatic, Bilateral external ears normal. Nose normal.   Neck: Slight tenderness C5, no deformity or step-off, some associated left-sided paraspinous spasm and tenderness, no erythema or edema  Eyes: Pupils are equal and reactive. Conjunctiva normal, non-icteric.   Heart: Regular rate and rhythm, no murmurs.    Lungs: Clear to auscultation bilaterally.  Skin: Warm, Dry, No erythema, No rash.   Musculoskeletal: Mild tenderness over posterior aspect of left shoulder, no deformity, erythema or swelling.  Patient with pain with flexion of shoulder as well as abduction, although patient is able to perform without weakness, no other pain noted with range of motion, abduction, internal or external rotation or extension, distal capillary refill less than 2 seconds, distal sensation intact in median, radial, ulnar distribution, makes OK sign, crosses digits 2 and 3, thumb up,  is 5 out of 5, strength 5 out of 5 with bicep, tricep no other  tenderness or major deformities noted. No edema.  Neurologic: Alert, Grossly non-focal.   Psychiatric: Affect normal, Judgment normal, Mood normal, Appears appropriate and not intoxicated.     DIAGNOSTIC STUDIES / PROCEDURES        RADIOLOGY  CT-CSPINE WITHOUT PLUS RECONS   Final Result         1.  Negative CT scan of the cervical spine.  No acute fracture or subluxation.      2.  Mild degenerative disc disease at C5-6 and C6-7.      3.  Minor anterior osteophytic spurring at C4-5 and C7-T1.      4.  Right-sided facet arthropathy at C4-5.      DX-SHOULDER 2+ LEFT   Final Result      Negative shoulder series aside from glenoid and AC joint spurring.        The radiologist's interpretation of all radiological studies have been reviewed by me.    COURSE & MEDICAL DECISION MAKING  Nursing notes, VS, PMSFHx reviewed in chart.    2:20 PM - Patient seen and examined at bedside. Patient will be treated with ketorolac. Ordered for CT, x-ray to evaluate his symptoms.     4:19  PM  Patient reevaluated, states is feeling much improved, updated on results will plan for discharge    Decision Making:  This is a 63 y.o. male presenting with neck and shoulder pain.  The nature of his symptoms is suggestive of some radiculopathy, he does have some associated muscle spasm as well when prior diagnosis of rotator cuff injury.  His oral Toradol is not working for him however I do not feel that narcotics are indicated at this time.  We will switch to Naprosyn, as well as Flexeril as needed for spasm and pain.  I will avoid steroids at this time given his diabetes.  There is no evidence of neurovascular compromise on exam, his no fevers physical exam findings or symptoms suggestive of infectious process either.  There is no evidence of spinal compromise or cord compression.    The patient will return for new or worsening symptoms and is stable at the time of discharge.    The patient is referred to a primary physician for blood pressure management, diabetic screening, and for all other preventative health concerns.      DISPOSITION:  Patient will be discharged home in stable condition.    FOLLOW UP:  At your appointment on July 10            OUTPATIENT MEDICATIONS:  New Prescriptions    CYCLOBENZAPRINE (FLEXERIL) 5 MG TABLET    Take 1-2 Tabs by mouth 3 times a day as needed for Moderate Pain.    NAPROXEN (NAPROSYN) 500 MG TAB    Take 1 Tab by mouth 2 times daily with meals as needed (pain).         FINAL IMPRESSION  1. Muscle spasm    2. Acute pain of left shoulder    3. Degenerative disc disease, cervical        The note accurately reflects work and decisions made by me.  Chauncey Patrick  7/1/2018  4:22 PM

## 2018-07-01 NOTE — DISCHARGE INSTRUCTIONS
Cervical Radiculopathy  Introduction  Cervical radiculopathy means that a nerve in the neck is pinched or bruised. This can cause pain or loss of feeling (numbness) that runs from your neck to your arm and fingers.  Follow these instructions at home:  Managing pain  · Take over-the-counter and prescription medicines only as told by your doctor.  · If directed, put ice on the injured or painful area.  ¨ Put ice in a plastic bag.  ¨ Place a towel between your skin and the bag.  ¨ Leave the ice on for 20 minutes, 2-3 times per day.  · If ice does not help, you can try using heat. Take a warm shower or warm bath, or use a heat pack as told by your doctor.  · You may try a gentle neck and shoulder massage.  Activity  · Rest as needed. Follow instructions from your doctor about any activities to avoid.  · Do exercises as told by your doctor or physical therapist.  General instructions  · If you were given a soft collar, wear it as told by your doctor.  · Use a flat pillow when you sleep.  · Keep all follow-up visits as told by your doctor. This is important.  Contact a doctor if:  · Your condition does not improve with treatment.  Get help right away if:  · Your pain gets worse and is not controlled with medicine.  · You lose feeling or feel weak in your hand, arm, face, or leg.  · You have a fever.  · You have a stiff neck.  · You cannot control when you poop or pee (have incontinence).  · You have trouble with walking, balance, or talking.  This information is not intended to replace advice given to you by your health care provider. Make sure you discuss any questions you have with your health care provider.  Document Released: 12/06/2012 Document Revised: 05/25/2017 Document Reviewed: 02/11/2016  © 2017 [employer]

## 2018-07-01 NOTE — ED NOTES
Patient provided with discharge information.  Verbalizes understanding of information.  Given RX x2.  Ambulates with a steady gait.

## 2018-07-10 ENCOUNTER — APPOINTMENT (OUTPATIENT)
Dept: INTERNAL MEDICINE | Facility: MEDICAL CENTER | Age: 63
End: 2018-07-10
Payer: COMMERCIAL

## 2021-03-03 DIAGNOSIS — Z23 NEED FOR VACCINATION: ICD-10-CM

## 2024-01-25 ENCOUNTER — HOSPITAL ENCOUNTER (EMERGENCY)
Facility: MEDICAL CENTER | Age: 69
End: 2024-01-25
Attending: EMERGENCY MEDICINE
Payer: COMMERCIAL

## 2024-01-25 VITALS
OXYGEN SATURATION: 96 % | WEIGHT: 179.9 LBS | SYSTOLIC BLOOD PRESSURE: 157 MMHG | TEMPERATURE: 97.8 F | HEART RATE: 70 BPM | DIASTOLIC BLOOD PRESSURE: 77 MMHG | BODY MASS INDEX: 24.4 KG/M2 | RESPIRATION RATE: 16 BRPM

## 2024-01-25 DIAGNOSIS — R19.7 DIARRHEA, UNSPECIFIED TYPE: ICD-10-CM

## 2024-01-25 DIAGNOSIS — E11.65 UNCONTROLLED TYPE 2 DIABETES MELLITUS WITH HYPERGLYCEMIA (HCC): ICD-10-CM

## 2024-01-25 DIAGNOSIS — T07.XXXA MULTIPLE OPEN WOUNDS: ICD-10-CM

## 2024-01-25 LAB
ALBUMIN SERPL BCP-MCNC: 3.5 G/DL (ref 3.2–4.9)
ALBUMIN/GLOB SERPL: 1 G/DL
ALP SERPL-CCNC: 100 U/L (ref 30–99)
ALT SERPL-CCNC: 49 U/L (ref 2–50)
ANION GAP SERPL CALC-SCNC: 14 MMOL/L (ref 7–16)
AST SERPL-CCNC: 26 U/L (ref 12–45)
BASOPHILS # BLD AUTO: 0.2 % (ref 0–1.8)
BASOPHILS # BLD: 0.02 K/UL (ref 0–0.12)
BILIRUB SERPL-MCNC: 0.5 MG/DL (ref 0.1–1.5)
BUN SERPL-MCNC: 24 MG/DL (ref 8–22)
CALCIUM ALBUM COR SERPL-MCNC: 8.5 MG/DL (ref 8.5–10.5)
CALCIUM SERPL-MCNC: 8.1 MG/DL (ref 8.5–10.5)
CHLORIDE SERPL-SCNC: 104 MMOL/L (ref 96–112)
CO2 SERPL-SCNC: 20 MMOL/L (ref 20–33)
CREAT SERPL-MCNC: 1.03 MG/DL (ref 0.5–1.4)
EOSINOPHIL # BLD AUTO: 0.05 K/UL (ref 0–0.51)
EOSINOPHIL NFR BLD: 0.5 % (ref 0–6.9)
ERYTHROCYTE [DISTWIDTH] IN BLOOD BY AUTOMATED COUNT: 39.5 FL (ref 35.9–50)
GFR SERPLBLD CREATININE-BSD FMLA CKD-EPI: 79 ML/MIN/1.73 M 2
GLOBULIN SER CALC-MCNC: 3.4 G/DL (ref 1.9–3.5)
GLUCOSE SERPL-MCNC: 300 MG/DL (ref 65–99)
HCT VFR BLD AUTO: 34.3 % (ref 42–52)
HGB BLD-MCNC: 12.2 G/DL (ref 14–18)
IMM GRANULOCYTES # BLD AUTO: 0.04 K/UL (ref 0–0.11)
IMM GRANULOCYTES NFR BLD AUTO: 0.4 % (ref 0–0.9)
LYMPHOCYTES # BLD AUTO: 1.62 K/UL (ref 1–4.8)
LYMPHOCYTES NFR BLD: 17 % (ref 22–41)
MCH RBC QN AUTO: 30.1 PG (ref 27–33)
MCHC RBC AUTO-ENTMCNC: 35.6 G/DL (ref 32.3–36.5)
MCV RBC AUTO: 84.7 FL (ref 81.4–97.8)
MONOCYTES # BLD AUTO: 0.89 K/UL (ref 0–0.85)
MONOCYTES NFR BLD AUTO: 9.3 % (ref 0–13.4)
NEUTROPHILS # BLD AUTO: 6.91 K/UL (ref 1.82–7.42)
NEUTROPHILS NFR BLD: 72.6 % (ref 44–72)
NRBC # BLD AUTO: 0 K/UL
NRBC BLD-RTO: 0 /100 WBC (ref 0–0.2)
PLATELET # BLD AUTO: 223 K/UL (ref 164–446)
PMV BLD AUTO: 9.9 FL (ref 9–12.9)
POTASSIUM SERPL-SCNC: 3.5 MMOL/L (ref 3.6–5.5)
PROT SERPL-MCNC: 6.9 G/DL (ref 6–8.2)
RBC # BLD AUTO: 4.05 M/UL (ref 4.7–6.1)
SODIUM SERPL-SCNC: 138 MMOL/L (ref 135–145)
WBC # BLD AUTO: 9.5 K/UL (ref 4.8–10.8)

## 2024-01-25 PROCEDURE — A9270 NON-COVERED ITEM OR SERVICE: HCPCS | Mod: UD | Performed by: EMERGENCY MEDICINE

## 2024-01-25 PROCEDURE — 700101 HCHG RX REV CODE 250: Mod: UD | Performed by: EMERGENCY MEDICINE

## 2024-01-25 PROCEDURE — 700102 HCHG RX REV CODE 250 W/ 637 OVERRIDE(OP): Mod: UD | Performed by: EMERGENCY MEDICINE

## 2024-01-25 PROCEDURE — 36415 COLL VENOUS BLD VENIPUNCTURE: CPT

## 2024-01-25 PROCEDURE — 80053 COMPREHEN METABOLIC PANEL: CPT

## 2024-01-25 PROCEDURE — 85025 COMPLETE CBC W/AUTO DIFF WBC: CPT

## 2024-01-25 PROCEDURE — 99283 EMERGENCY DEPT VISIT LOW MDM: CPT

## 2024-01-25 RX ORDER — SULFAMETHOXAZOLE AND TRIMETHOPRIM 800; 160 MG/1; MG/1
1 TABLET ORAL 2 TIMES DAILY
Qty: 14 TABLET | Refills: 0 | Status: ACTIVE | OUTPATIENT
Start: 2024-01-25 | End: 2024-02-01

## 2024-01-25 RX ORDER — CHLORHEXIDINE GLUCONATE 4 G/100ML
1 SOLUTION TOPICAL
Qty: 473 ML | Refills: 0 | Status: SHIPPED | OUTPATIENT
Start: 2024-01-25

## 2024-01-25 RX ORDER — LOPERAMIDE HYDROCHLORIDE 2 MG/1
2 CAPSULE ORAL 4 TIMES DAILY PRN
Qty: 30 CAPSULE | Refills: 0 | Status: SHIPPED | OUTPATIENT
Start: 2024-01-25

## 2024-01-25 RX ORDER — SULFAMETHOXAZOLE AND TRIMETHOPRIM 800; 160 MG/1; MG/1
1 TABLET ORAL ONCE
Status: COMPLETED | OUTPATIENT
Start: 2024-01-25 | End: 2024-01-25

## 2024-01-25 RX ORDER — LISINOPRIL 20 MG/1
10 TABLET ORAL DAILY
Qty: 30 TABLET | Refills: 0 | Status: SHIPPED | OUTPATIENT
Start: 2024-01-25

## 2024-01-25 RX ADMIN — SULFAMETHOXAZOLE AND TRIMETHOPRIM 1 TABLET: 800; 160 TABLET ORAL at 16:44

## 2024-01-25 RX ADMIN — MUPIROCIN 1 APPLICATION: 20 OINTMENT TOPICAL at 17:12

## 2024-01-25 NOTE — ED TRIAGE NOTES
Chief Complaint   Patient presents with    Wound Check     Patient with multiple complains. Wounds on bilateral hands from shoveling snow. Patient also complains of diarrhea x 2 months and dark colored urine x 1 month. Per patient hx diabetes.         Patient educated on triage process. Informed to notified ED staff of change in symptoms.     Vitals:    01/25/24 1303   BP: (!) 150/74   Pulse: 74   Resp: 18   Temp: 36.3 °C (97.4 °F)   SpO2: 98%

## 2024-01-26 ENCOUNTER — PATIENT OUTREACH (OUTPATIENT)
Dept: SCHEDULING | Facility: IMAGING CENTER | Age: 69
End: 2024-01-26
Payer: COMMERCIAL

## 2024-01-26 NOTE — ED PROVIDER NOTES
ED Provider Note    Primary care provider: Pcp Pt States None    CHIEF COMPLAINT  Chief Complaint   Patient presents with    Wound Check     Patient with multiple complains. Wounds on bilateral hands from shoveling snow. Patient also complains of diarrhea x 2 months and dark colored urine x 1 month. Per patient hx diabetes.        HPI  Kuldip Perrin is a 68 y.o. male who is homeless, usually sleeping in his car, history of diabetes but not on any medications who presents to the Emergency Department for several issues.  He is afraid that he might have MRSA as he has been developing blisters that turned into scabs on his hands and lower extremities.  He also has had intermittent watery diarrhea for months, this is mostly a concern at night as he sleeps in his car and he does not have access to the bathroom, therefore he has fecal incontinence and with every night and has to use a laundromat on a daily basis.  He notes some dark-colored urine, does not have any urinary or fecal incontinence during the day.  Denies any specific pains.  Has numbness of the hands and feet that have been present for a few years, does not have any numbness in the perineum, back or legs.    REVIEW OF SYSTEMS  See HPI.     PAST MEDICAL HISTORY   has a past medical history of Diabetes (2009), GERD (gastroesophageal reflux disease), Hyperlipidemia, and Hypertension.    SURGICAL HISTORY   has a past surgical history that includes appendectomy.    SOCIAL HISTORY  Social History     Tobacco Use    Smoking status: Never    Smokeless tobacco: Never   Substance Use Topics    Alcohol use: No    Drug use: No      Social History     Substance and Sexual Activity   Drug Use No       FAMILY HISTORY  Family History   Problem Relation Age of Onset    Diabetes Mother     Cancer Mother         lung    Heart Disease Mother         CAD    Cancer Sister 59        colon    Diabetes Maternal Grandmother     Diabetes Maternal Grandfather     Stroke Maternal  Grandfather        CURRENT MEDICATIONS  Reviewed.  See Encounter Summary.     ALLERGIES  Allergies   Allergen Reactions    Cephalexin Shortness of Breath and Swelling     Rxn - 10/2015    Ciprofloxacin Unspecified     Ankle tendonitis    Codeine Unspecified     irritable       PHYSICAL EXAM  VITAL SIGNS: BP (!) 157/77   Pulse 70   Temp 36.6 °C (97.8 °F)   Resp 16   Wt 81.6 kg (179 lb 14.3 oz)   SpO2 96%   BMI 24.40 kg/m²   Constitutional: Awake, alert in no apparent distress.  HENT: Normocephalic, Bilateral external ears normal. Nose normal.   Eyes: Conjunctiva normal, non-icteric, EOMI.    Thorax & Lungs: Easy unlabored respirations  Cardiovascular: Regular rate and rhythm  Abdomen:  No distention  Skin: Multiple weeping scabs noted on the bilateral hands, left forearm, left lower leg, bulla noted to the dorsal aspect of the right fourth finger.  Extremities:   atraumatic   Neurologic: Alert, Grossly non-focal.   Psychiatric: Affect and Mood normal      COURSE & MEDICAL DECISION MAKING  Pertinent Labs & Imaging studies reviewed. (See chart for details)    Differential diagnoses include but are not limited to: Concerning for MRSA.    4:07 PM - Nursing notes reviewed, patient seen and examined at bedside.    Escalation of care considered, and ultimately not performed: blood analysis and diagnostic imaging.    Barriers to care at this time, including but not limited to: Patient does not have established PCP, Patient is homeless, and Patient had difficult affording medications.     Decision Making:  This is a pleasant 68 y.o. year old male who presents with a rash on his hands.  He also has had intermittent diarrhea for a long period of time, has been off all his medications for a long period of time and does not have a primary care follow-up.  The patient unfortunately does not have much in the way of resources, undomiciled and predominantly sleeping in his car.  Despite this he is well-kempt and does have the  ability to get a shower, get his prescriptions filled etc.    I did some screening labs today, he does not have any leukocytosis or leftward shift, he does have hyperglycemia without evidence of DKA.  I will restart him on his metformin.  I explained that starting this can actually worsen diarrhea and I will give him some motility agents to help alleviate that.  I will also place him on a combination of topical antibiotics as well as Bactrim for what appears to be multiple spontaneous abscesses on his hands and lower extremity.  Suspect MRSA, likely exacerbated by uncontrolled diabetes.  Hibiclens has been ordered as well which may improve his bacterial jessica.    I have ordered a outpatient follow-up for primary care.  Recommend he obtain primary follow-up.  If his symptoms do not improve or certainly if he has spreading rashes, would recommend revisit here.       The patient was discharged (see d/c instructions) was told to return immediately for any signs or symptoms listed, or any worsening at all.  The patient verbally agreed to the discharge precautions and follow-up plan which is documented in EPIC.    Discharge Medications:  Discharge Medication List as of 1/25/2024  6:24 PM        START taking these medications    Details   mupirocin (BACTROBAN) 2 % Ointment Apply 1 Application topically 2 times a day., Disp-22 g, R-0, Normal      sulfamethoxazole-trimethoprim (BACTRIM DS) 800-160 MG tablet Take 1 Tablet by mouth 2 times a day for 7 days., Disp-14 Tablet, R-0, Normal      chlorhexidine (HIBICLENS) 4 % liquid Apply 1 Application topically two times a week.Wash from neck to feet. Do not get in eyesDisp-473 mL, R-0, Normal      loperamide (IMODIUM) 2 MG Cap Take 1 Capsule by mouth 4 times a day as needed for Diarrhea., Disp-30 Capsule, R-0, Normal             FINAL IMPRESSION  1. Multiple open wounds    2. Uncontrolled type 2 diabetes mellitus with hyperglycemia (HCC)    3. Diarrhea, unspecified type

## 2024-01-29 ENCOUNTER — PATIENT OUTREACH (OUTPATIENT)
Dept: SCHEDULING | Facility: IMAGING CENTER | Age: 69
End: 2024-01-29
Payer: COMMERCIAL

## 2024-02-21 ENCOUNTER — TELEPHONE (OUTPATIENT)
Dept: SCHEDULING | Facility: IMAGING CENTER | Age: 69
End: 2024-02-21
Payer: COMMERCIAL

## 2024-03-05 ENCOUNTER — HOSPITAL ENCOUNTER (EMERGENCY)
Facility: MEDICAL CENTER | Age: 69
End: 2024-03-06
Attending: EMERGENCY MEDICINE
Payer: MEDICAID

## 2024-03-05 DIAGNOSIS — M79.674 TOE PAIN, RIGHT: ICD-10-CM

## 2024-03-05 DIAGNOSIS — T14.8XXA FRICTION BLISTER: ICD-10-CM

## 2024-03-05 DIAGNOSIS — R73.9 HYPERGLYCEMIA: ICD-10-CM

## 2024-03-05 PROCEDURE — 99283 EMERGENCY DEPT VISIT LOW MDM: CPT

## 2024-03-05 ASSESSMENT — FIBROSIS 4 INDEX: FIB4 SCORE: 1.13

## 2024-03-06 ENCOUNTER — APPOINTMENT (OUTPATIENT)
Dept: RADIOLOGY | Facility: MEDICAL CENTER | Age: 69
End: 2024-03-06
Attending: EMERGENCY MEDICINE
Payer: MEDICAID

## 2024-03-06 VITALS
SYSTOLIC BLOOD PRESSURE: 127 MMHG | DIASTOLIC BLOOD PRESSURE: 64 MMHG | TEMPERATURE: 98.7 F | RESPIRATION RATE: 16 BRPM | OXYGEN SATURATION: 97 % | BODY MASS INDEX: 23.03 KG/M2 | HEART RATE: 82 BPM | WEIGHT: 170 LBS | HEIGHT: 72 IN

## 2024-03-06 LAB
ALBUMIN SERPL BCP-MCNC: 3.9 G/DL (ref 3.2–4.9)
ALBUMIN/GLOB SERPL: 1.3 G/DL
ALP SERPL-CCNC: 110 U/L (ref 30–99)
ALT SERPL-CCNC: 10 U/L (ref 2–50)
ANION GAP SERPL CALC-SCNC: 12 MMOL/L (ref 7–16)
AST SERPL-CCNC: 14 U/L (ref 12–45)
BASE EXCESS BLDV CALC-SCNC: -2 MMOL/L
BASOPHILS # BLD AUTO: 0.2 % (ref 0–1.8)
BASOPHILS # BLD: 0.02 K/UL (ref 0–0.12)
BILIRUB SERPL-MCNC: 0.5 MG/DL (ref 0.1–1.5)
BODY TEMPERATURE: 37.7 CENTIGRADE
BUN SERPL-MCNC: 20 MG/DL (ref 8–22)
CALCIUM ALBUM COR SERPL-MCNC: 8.6 MG/DL (ref 8.5–10.5)
CALCIUM SERPL-MCNC: 8.5 MG/DL (ref 8.5–10.5)
CHLORIDE SERPL-SCNC: 104 MMOL/L (ref 96–112)
CO2 SERPL-SCNC: 22 MMOL/L (ref 20–33)
CREAT SERPL-MCNC: 0.69 MG/DL (ref 0.5–1.4)
EOSINOPHIL # BLD AUTO: 0.09 K/UL (ref 0–0.51)
EOSINOPHIL NFR BLD: 0.9 % (ref 0–6.9)
ERYTHROCYTE [DISTWIDTH] IN BLOOD BY AUTOMATED COUNT: 41.4 FL (ref 35.9–50)
GFR SERPLBLD CREATININE-BSD FMLA CKD-EPI: 100 ML/MIN/1.73 M 2
GLOBULIN SER CALC-MCNC: 3 G/DL (ref 1.9–3.5)
GLUCOSE SERPL-MCNC: 161 MG/DL (ref 65–99)
HCO3 BLDV-SCNC: 22 MMOL/L (ref 24–28)
HCT VFR BLD AUTO: 35.7 % (ref 42–52)
HGB BLD-MCNC: 12.3 G/DL (ref 14–18)
IMM GRANULOCYTES # BLD AUTO: 0.03 K/UL (ref 0–0.11)
IMM GRANULOCYTES NFR BLD AUTO: 0.3 % (ref 0–0.9)
INHALED O2 FLOW RATE: ABNORMAL L/MIN
LYMPHOCYTES # BLD AUTO: 2.51 K/UL (ref 1–4.8)
LYMPHOCYTES NFR BLD: 24.1 % (ref 22–41)
MAGNESIUM SERPL-MCNC: 2 MG/DL (ref 1.5–2.5)
MCH RBC QN AUTO: 30.1 PG (ref 27–33)
MCHC RBC AUTO-ENTMCNC: 34.5 G/DL (ref 32.3–36.5)
MCV RBC AUTO: 87.5 FL (ref 81.4–97.8)
MONOCYTES # BLD AUTO: 0.98 K/UL (ref 0–0.85)
MONOCYTES NFR BLD AUTO: 9.4 % (ref 0–13.4)
NEUTROPHILS # BLD AUTO: 6.8 K/UL (ref 1.82–7.42)
NEUTROPHILS NFR BLD: 65.1 % (ref 44–72)
NRBC # BLD AUTO: 0 K/UL
NRBC BLD-RTO: 0 /100 WBC (ref 0–0.2)
PCO2 BLDV: 35.4 MMHG (ref 41–51)
PCO2 TEMP ADJ BLDV: 36.5 MMHG (ref 41–51)
PH BLDV: 7.42 [PH] (ref 7.31–7.45)
PH TEMP ADJ BLDV: 7.41 [PH] (ref 7.31–7.45)
PLATELET # BLD AUTO: 242 K/UL (ref 164–446)
PMV BLD AUTO: 9.9 FL (ref 9–12.9)
PO2 BLDV: 44.1 MMHG (ref 25–40)
PO2 TEMP ADJ BLDV: 46.3 MMHG (ref 25–40)
POTASSIUM SERPL-SCNC: 3.4 MMOL/L (ref 3.6–5.5)
PROT SERPL-MCNC: 6.9 G/DL (ref 6–8.2)
RBC # BLD AUTO: 4.08 M/UL (ref 4.7–6.1)
SAO2 % BLDV: 80.5 %
SODIUM SERPL-SCNC: 138 MMOL/L (ref 135–145)
WBC # BLD AUTO: 10.4 K/UL (ref 4.8–10.8)

## 2024-03-06 PROCEDURE — A6403 STERILE GAUZE>16 <= 48 SQ IN: HCPCS

## 2024-03-06 PROCEDURE — 36415 COLL VENOUS BLD VENIPUNCTURE: CPT

## 2024-03-06 PROCEDURE — 85025 COMPLETE CBC W/AUTO DIFF WBC: CPT

## 2024-03-06 PROCEDURE — 83735 ASSAY OF MAGNESIUM: CPT

## 2024-03-06 PROCEDURE — 73630 X-RAY EXAM OF FOOT: CPT | Mod: RT

## 2024-03-06 PROCEDURE — 82803 BLOOD GASES ANY COMBINATION: CPT

## 2024-03-06 PROCEDURE — 80053 COMPREHEN METABOLIC PANEL: CPT

## 2024-03-06 NOTE — ED NOTES
Bedside report received from off going RN: Camille, assumed care of patient.  POC discussed with patient. Call light within reach, ERP at bedside.     Fall risk interventions in place: Not Applicable (all applicable per [place] Fall risk assessment)   Continuous monitoring: Pulse Ox or Blood Pressure  IVF/IV medications: Not Applicable   Oxygen: Room Air  Bedside sitter: Not Applicable   Isolation: Not Applicable

## 2024-03-06 NOTE — ED NOTES
Pt to [facility] from [facility] via w/c, pt able to stand and ambulate independently.  On monitor, call light in reach. Chart up for ERP.    Clinical photos placed in chart

## 2024-03-06 NOTE — ED TRIAGE NOTES
Chief Complaint   Patient presents with    Foot Pain     R foot blister first noticed 2 days ago. Pt reports noticed soreness but didn't realize how bad the blister had gotten.     Vitals:    03/05/24 2215   BP: 131/76   Pulse: 84   Resp: 17   Temp: 37.1 °C (98.8 °F)   SpO2: 97%     Pt ambulatory to triage w/ above complaint. Pt has wound wrapped in gauze due to skin tear around blister site, placed by ED tech.   Hx of DM, HTN.   Pt also requesting to shower while here.  Placed pt back in lobby, educated on NPO status, notified to alert staff of any changes/worsening symptoms.

## 2024-03-06 NOTE — ED PROVIDER NOTES
ED Provider Note    CHIEF COMPLAINT  Chief Complaint   Patient presents with    Foot Pain     R foot blister first noticed 2 days ago. Pt reports noticed soreness but didn't realize how bad the blister had gotten.       EXTERNAL RECORDS REVIEWED  Prior ER visit from 1/25/2024 the patient was seen for several complaints from wounds on his bilateral hands following shoveling snow and concerns regarding management of his diabetes and some dark urine.  Has a concurrent history of long-term intermittent diarrheal illness.  He is undomiciled and has been predominantly sleeping in his car.  Documented history of hyperglycemia.  Restarted on metformin on this encounter    HPI/ROS  LIMITATION TO HISTORY   Select: : None  OUTSIDE HISTORIAN(S):  none    Kuldip Perrin is a 68 y.o. male who presents to the emergency room for concerns regarding right foot wound and some chronic diarrhea.  The patient states has been dealing with loose stools for a long period of time and has been told previously that he likely need to restart metformin however he was also prescribed antibiotic for some skin lesions at that time and decided to take that instead and said that while his skin lesions improved he has not had any overall improvement in his diarrhea.  This is not associated with fevers, he is not having rectal pain, he has not had any abdominal bloating.  He has been homeless, with car is broken down he has been on his feet excessively and noticed that he was having blisters develop on his right foot.  He has had 1 in the arch of his foot, he had 1 on his right great toe that eventually broke and caused blood to be in his sock.  He does have some element neuropathies foul smell, discharge or any other acute traumatic injuries at this time.  He is currently in 2 out of 10 discomfort, is requesting to talk to a  regarding getting reenrolled in Medicare/Medicaid so he can afford his medications.    PAST MEDICAL  HISTORY   has a past medical history of Diabetes (2009), GERD (gastroesophageal reflux disease), Hyperlipidemia, and Hypertension.    SURGICAL HISTORY   has a past surgical history that includes appendectomy.    FAMILY HISTORY  Family History   Problem Relation Age of Onset    Diabetes Mother     Cancer Mother         lung    Heart Disease Mother         CAD    Cancer Sister 59        colon    Diabetes Maternal Grandmother     Diabetes Maternal Grandfather     Stroke Maternal Grandfather        SOCIAL HISTORY  Social History     Tobacco Use    Smoking status: Never    Smokeless tobacco: Never   Substance and Sexual Activity    Alcohol use: No    Drug use: No    Sexual activity: Not on file     Comment: ,promotional advertising       CURRENT MEDICATIONS  Home Medications    **Home medications have not yet been reviewed for this encounter**         ALLERGIES  Allergies   Allergen Reactions    Cephalexin Shortness of Breath and Swelling     Rxn - 10/2015    Ciprofloxacin Unspecified     Ankle tendonitis    Codeine Unspecified     irritable       PHYSICAL EXAM  VITAL SIGNS: /64   Pulse 82   Temp 37.1 °C (98.7 °F) (Temporal)   Resp 16   Ht 1.829 m (6')   Wt 77.1 kg (170 lb)   SpO2 97%   BMI 23.06 kg/m²    Genl: M sitting in chair comfortably, speaking clearly, appears in no acute distress   Head: NC/AT   ENT: Mucous membranes moist, posterior pharynx clear, uvula midline, nares patent bilaterally   Eyes: Normal sclera, pupils equal round reactive to light  Neck: Supple, FROM, no LAD appreciated   Pulmonary: Lungs are clear to auscultation bilaterally  Chest: No TTP  CV:  RRR, no murmur appreciated, pulses 2+ in both upper and lower extremities,  Abdomen: soft, NT/ND; no rebound/guarding, no masses palpated, no HSM   Musculoskeletal: Pain free ROM of the neck. Moving upper and lower extremities in spontaneous and coordinated fashion  Right Lower Extremity  - Skin: Broken friction blister is noted on  the right great toe going on the palmar surface and towards the MTP.  No active bleeding, no purulence or foul-smelling discharge.  No surrounding erythema or induration.  Nonbroken friction blisters are noted on the distal portion of the second digit and on the arch.  The remainder of the exam on the extremity shows no abrasions, lacerations or ecchymosis  - Motor: Full ROM at knee, ankle; 5/5 ankle dorsal/plantar flexion, EHL/FHL intact  - Sensation intact to superficial/deep peroneal, tibial, saphenous, sural nerves  - 2+ dorsalis pedis and posterior tibialis, cap refill < 2 seconds x 5 digits            DIAGNOSTIC STUDIES / PROCEDURES  LABS  Labs Reviewed   CBC WITH DIFFERENTIAL - Abnormal; Notable for the following components:       Result Value    RBC 4.08 (*)     Hemoglobin 12.3 (*)     Hematocrit 35.7 (*)     Monos (Absolute) 0.98 (*)     All other components within normal limits   COMP METABOLIC PANEL - Abnormal; Notable for the following components:    Potassium 3.4 (*)     Glucose 161 (*)     Alkaline Phosphatase 110 (*)     All other components within normal limits   VENOUS BLOOD GAS - Abnormal; Notable for the following components:    Venous Bg Pco2 35.4 (*)     Venous Bg Pco2 Temp Corrected 36.5 (*)     Venous Bg Po2 44.1 (*)     Venous Bg Po2 Temp Corrected 46.3 (*)     Venous Bg Hco3 22 (*)     All other components within normal limits   MAGNESIUM   ESTIMATED GFR     RADIOLOGY  I have independently interpreted the diagnostic imaging associated with this visit and am waiting the final reading from the radiologist.   My preliminary interpretation is as follows: Foot wound is noted, no bony changes, no free air, no other acute findings.  Radiologist interpretation:   DX-FOOT-COMPLETE 3+ RIGHT   Final Result      1.  Right medial forefoot open wound      2.  No radiographic evidence for osteomyelitis      3.  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  calcaneal spurring        COURSE & MEDICAL  DECISION MAKING    ED Observation Status? No; Patient does not meet criteria for ED Observation.     INITIAL ASSESSMENT, COURSE AND PLAN  Friction blister, hyperglycemia, electrolyte derangement, DEBORA, puncture wound,    Care Narrative: Patient presents emergency room for symptoms as described above.  There is a mention of the possibility of him having poorly controlled diabetes so sugar appeared to be under 200 and lab work was obtained which showed no acidosis, no anion gap or bicarb changes and no acute electrolyte abnormalities or leukocytosis.  The patient has friction blisters on the right foot consistent with being on his foot and being undomiciled.  There is a easily replaceable skin flap that can be readjusted and use as a barrier following soaking irrigation and cleaning of the foot site a nonadherent dressing and bandage was applied.  X-ray was reviewed above that did not show any free air or other changes deep into the foot suggest penetrating injury or other acute bony changes.  Lab work does not show any significant findings concerning for acute infection or diabetic emergency.  At this time I have gone back to the bedside, dressed the patient's wounds, vital signs remained stable with no febrile illness or tachycardia and he is given information on following up with the wound care specialist, about reestablishing with a primary care doctor for management of his hyperglycemia and he is aware that he has metformin and awaiting him for a previous provider.    Questions are addressed and the patient is discharged home in stable condition.    DISPOSITION AND DISCUSSIONS  I have discussed management of the patient with the following physicians and HARLAN's:  none    Discussion of management with other QHP or appropriate source(s): None     Escalation of care considered, and ultimately not performed:none    Barriers to care at this time, including but not limited to: Patient is homeless and Patient lacks  transportation .     Decision tools and prescription drugs considered including, but not limited to: none.    FINAL DIAGNOSIS  1. Friction blister    2. Toe pain, right    3. Hyperglycemia      Electronically signed by: Tommie Penn M.D., 3/6/2024 12:12 AM

## 2024-03-06 NOTE — ED NOTES
Pt refused tetanus vaccine. Pt proved w/ post-op boot for comfort. Pt also provided w/ crutches and education on use.

## 2024-03-06 NOTE — ED NOTES
PIV established, labs collected and sent, xray at bedside. Pt soaking foot in basin w/ warm water.